# Patient Record
Sex: FEMALE | Race: WHITE | NOT HISPANIC OR LATINO
[De-identification: names, ages, dates, MRNs, and addresses within clinical notes are randomized per-mention and may not be internally consistent; named-entity substitution may affect disease eponyms.]

---

## 2019-12-31 PROBLEM — Z00.00 ENCOUNTER FOR PREVENTIVE HEALTH EXAMINATION: Status: ACTIVE | Noted: 2019-12-31

## 2020-01-30 ENCOUNTER — APPOINTMENT (OUTPATIENT)
Dept: GASTROENTEROLOGY | Facility: CLINIC | Age: 50
End: 2020-01-30
Payer: COMMERCIAL

## 2020-01-30 VITALS
OXYGEN SATURATION: 98 % | HEIGHT: 67 IN | BODY MASS INDEX: 21.35 KG/M2 | WEIGHT: 136 LBS | TEMPERATURE: 98.6 F | SYSTOLIC BLOOD PRESSURE: 105 MMHG | HEART RATE: 74 BPM | DIASTOLIC BLOOD PRESSURE: 65 MMHG | RESPIRATION RATE: 15 BRPM

## 2020-01-30 PROCEDURE — 99205 OFFICE O/P NEW HI 60 MIN: CPT

## 2020-01-30 NOTE — HISTORY OF PRESENT ILLNESS
[de-identified] : 49F presents for second opinion of GI symptoms, hiatal hernia and intestinal metaplasia. States that she felt her onset of gut symptoms sometime before DEC 2019 but they were minor so she just decided to do a 10 days cleanse with a friend. But symptoms persisted and worsened which brought her to see a GI at [place] for bloating, gas, burping, acid reflux. Had EGD showing "small hiatal hernia" as took gastric and duodenal biopsies with no celiac and gastric intestinal metaplasia on the one biopsy taken in the stomach. Took PANTOPRAZOLE [id number] with some relief. Afterward stopped seeing GI and followed up with nutritionist for dietary recommendations who referred her here for 2nd opinion. After making dietary changes has felt really good no bloating but still has a lot of gas and BURPING more than should be. Previously bowel movement every 3 day now with diet changes feels better \par NOW: no caffeine, no chocolate, no raw onion or garlic, no tomatoes\par BBQ sauce was a problem?  BURPING\par saw homeopath and ACIDIL and maybe helps bloating \par 1x a week incomplete evacuation \par hemorrhoids previously no bleeding \par ETOH 2 glasses/ night >10 years and now now only 2x/ weeks \par altered sleep \par no smoking\par no nsaids\par ran half marathons and developed tendinitis\par FHX- grandmother colon cancer, dad with BARRETTS, NO GASTRIC CANCER \par born in [place] \par

## 2020-01-30 NOTE — PHYSICAL EXAM
[General Appearance - Alert] : alert [General Appearance - In No Acute Distress] : in no acute distress [Sclera] : the sclera and conjunctiva were normal [Outer Ear] : the ears and nose were normal in appearance [Neck Appearance] : the appearance of the neck was normal [] : no respiratory distress [Heart Rate And Rhythm] : heart rate was normal and rhythm regular [Edema] : there was no peripheral edema [Bowel Sounds] : normal bowel sounds [Abdomen Soft] : soft [Abdomen Tenderness] : non-tender [Abdomen Mass (___ Cm)] : no abdominal mass palpated [No CVA Tenderness] : no ~M costovertebral angle tenderness [Abnormal Walk] : normal gait [Skin Color & Pigmentation] : normal skin color and pigmentation [No Focal Deficits] : no focal deficits [Oriented To Time, Place, And Person] : oriented to person, place, and time

## 2020-01-30 NOTE — ASSESSMENT
[FreeTextEntry1] : 49F with GAS, previous bloating and acid reflux and likely incomplete evacuation/constipation presents for second opinion.\par 1. Intestinal metaplasia with no clear risk factors -- no FHX, [place], no HP, unknown if diffuse or localized. \par - repeat EGD in 2 years with cody biopsy protocol\par - antiinflammatory diet/lifestyle including avoiding ETOH, smoking, nitrates, nitrites, nitrosamines, salty food such as salted fish, cured meat, salted vegetables, cheese, procesed meat and cured meat and avoid obestiy\par \par 2. Hiatal hernia\par -unknown size but may contribute to acid reflux although no inflammation seen on esophageal biopsies\par \par 3. Gas\par - obtain and review outside labs\par - low FODMAP diet, avoid carbonated beverages, fake sugars, chewing gum\par - referral to [facility]\par - f/u 6 weeks after full low FODMAP

## 2020-02-12 ENCOUNTER — TRANSCRIPTION ENCOUNTER (OUTPATIENT)
Age: 50
End: 2020-02-12

## 2020-02-20 ENCOUNTER — TRANSCRIPTION ENCOUNTER (OUTPATIENT)
Age: 50
End: 2020-02-20

## 2020-02-27 ENCOUNTER — TRANSCRIPTION ENCOUNTER (OUTPATIENT)
Age: 50
End: 2020-02-27

## 2020-03-07 ENCOUNTER — TRANSCRIPTION ENCOUNTER (OUTPATIENT)
Age: 50
End: 2020-03-07

## 2020-03-10 ENCOUNTER — APPOINTMENT (OUTPATIENT)
Dept: GASTROENTEROLOGY | Facility: CLINIC | Age: 50
End: 2020-03-10
Payer: COMMERCIAL

## 2020-03-10 VITALS
HEART RATE: 79 BPM | TEMPERATURE: 98.1 F | DIASTOLIC BLOOD PRESSURE: 65 MMHG | HEIGHT: 67 IN | BODY MASS INDEX: 20.72 KG/M2 | WEIGHT: 132 LBS | RESPIRATION RATE: 15 BRPM | OXYGEN SATURATION: 98 % | SYSTOLIC BLOOD PRESSURE: 97 MMHG

## 2020-03-10 PROCEDURE — 99215 OFFICE O/P EST HI 40 MIN: CPT

## 2020-03-10 NOTE — ASSESSMENT
[FreeTextEntry1] : 49F with GAS, previous bloating and acid reflux and likely incomplete evacuation/constipation presents for second opinion.\par 1. Intestinal metaplasia with no clear risk factors -- no FHX, [place], no HP, unknown if diffuse or localized. \par - repeat EGD in 2 years with cody biopsy protocol\par - antiinflammatory diet/lifestyle including avoiding ETOH, smoking, nitrates, nitrites, nitrosamines, salty food such as salted fish, cured meat, salted vegetables, cheese, procesed meat and cured meat and avoid obestiy\par \par 2. Hiatal hernia\par -unknown size but may contribute to acid reflux although no inflammation seen on esophageal biopsies\par \par 3. Gas\par - obtain and review outside labs\par - CONTINUE low FODMAP diet, avoid carbonated beverages, fake sugars, chewing gum. PT COUNSELED ON REINTRODUCTION OF FODMAP CATEGORIES AND TO CHECK IN VIA PORTAL IF ANY ISSUES\par - referral to [facility]\imani - f/u 6 weeks

## 2020-03-10 NOTE — HISTORY OF PRESENT ILLNESS
[de-identified] : 49F presents for second opinion of GI symptoms, hiatal hernia and intestinal metaplasia. \par 3/10/20\par - pt here to f/u after on low FODMAP diet failing reintroduction of FRUCTOSE but doing well with reintroduction of GLUTEN category\par - symptoms have improved significantly\par - pt also staying away from preservatives as an IM risk factor\par \par PREVIOUS HX\par States that she felt her onset of gut symptoms sometime before DEC 2019 but they were minor so she just decided to do a 10 days cleanse with a friend. But symptoms persisted and worsened which brought her to see a GI at [place] for bloating, gas, burping, acid reflux. Had EGD showing "small hiatal hernia" as took gastric and duodenal biopsies with no celiac and gastric intestinal metaplasia on the one biopsy taken in the stomach. Took PANTOPRAZOLE [id number] with some relief. Afterward stopped seeing GI and followed up with nutritionist for dietary recommendations who referred her here for 2nd opinion. After making dietary changes has felt really good no bloating but still has a lot of gas and BURPING more than should be. Previously bowel movement every 3 day now with diet changes feels better \par NOW: no caffeine, no chocolate, no raw onion or garlic, no tomatoes\par BBQ sauce was a problem?  BURPING\par saw homeopath and [facility]ID[facility] and maybe helps bloating \par 1x a week incomplete evacuation \par hemorrhoids previously no bleeding \par ETOH 2 glasses/ night >10 years and now now only 2x/ weeks \par altered sleep \par no smoking\par no nsaids\par ran half marathons and developed tendinitis\par FHX- grandmother colon cancer, dad with BARRETTS, NO GASTRIC CANCER \par born in [place] \par

## 2020-04-16 ENCOUNTER — APPOINTMENT (OUTPATIENT)
Dept: INTERNAL MEDICINE | Facility: CLINIC | Age: 50
End: 2020-04-16
Payer: COMMERCIAL

## 2020-04-16 PROCEDURE — 97802 MEDICAL NUTRITION INDIV IN: CPT | Mod: 95

## 2020-04-24 ENCOUNTER — APPOINTMENT (OUTPATIENT)
Dept: INTERNAL MEDICINE | Facility: CLINIC | Age: 50
End: 2020-04-24

## 2020-06-18 ENCOUNTER — APPOINTMENT (OUTPATIENT)
Dept: INTERNAL MEDICINE | Facility: CLINIC | Age: 50
End: 2020-06-18
Payer: COMMERCIAL

## 2020-06-18 PROCEDURE — 97803 MED NUTRITION INDIV SUBSEQ: CPT | Mod: 95

## 2020-08-06 ENCOUNTER — APPOINTMENT (OUTPATIENT)
Dept: GASTROENTEROLOGY | Facility: CLINIC | Age: 50
End: 2020-08-06
Payer: COMMERCIAL

## 2020-08-06 PROCEDURE — 99214 OFFICE O/P EST MOD 30 MIN: CPT | Mod: 95

## 2020-08-07 NOTE — ASSESSMENT
[FreeTextEntry1] : 50F with GAS, previous bloating and acid reflux and likely incomplete evacuation/constipation presents for second opinion, here for follow up \par \par 1. Intestinal metaplasia with no clear risk factors -- no FHX, born in [place], no HP, unknown if diffuse or localized. \par - repeat EGD in 1 year \par - antiinflammatory diet/lifestyle including avoiding ETOH, smoking, nitrates, nitrites, nitrosamines, salty food such as salted fish, cured meat, salted vegetables, cheese, processed meat and cured meat and avoid obestiy\par \par 2. Hiatal hernia\par -unknown size but may contribute to acid reflux although no inflammation seen on esophageal biopsies\par \par 3. Gas\par - CONTINUE low FODMAP diet, avoid carbonated beverages, fake sugars, chewing gum. PT COUNSELED ON REINTRODUCTION OF FODMAP CATEGORIES AND TO CHECK IN VIA PORTAL IF ANY ISSUES\par - continue f/u with [facility]\par -begin adding teaspoon a day of Psyllium husk daily and ground  flax seed \par - stay well hydrated, add green tea \par - stress reduction \par \par f/u via portal \par \par Mignon Crook NP \par \par time spent 25min

## 2020-08-07 NOTE — HISTORY OF PRESENT ILLNESS
[Home] : at home, [unfilled] , at the time of the visit. [Medical Office: ([facility])___] : at the medical office located in  [Verbal consent obtained from patient] : the patient, [unfilled] [de-identified] : 50F presents for second opinion of GI symptoms, hiatal hernia and intestinal metaplasia, here for follow up VIA TELEMEDICINE. \par \par 8/6/20 \par - mostly doing okay \par - wonders if has a lot of gas if could be cancer although gas is improved \par - never had colonoscopy \par - #1 symptom: gas from below and above\par - tried adding back in dairy and realized can only eat small amount if any\par - eating a lot is a trigger \par - taking Magnesium \par \par 3/10/20\par - pt here to f/u after on low FODMAP diet failing reintroduction of FRUCTOSE but doing well with reintroduction of GLUTEN category\par - symptoms have improved significantly\par - pt also staying away from preservatives as an IM risk factor\par \par PREVIOUS HX\par States that she felt her onset of gut symptoms sometime before DEC 2019 but they were minor so she just decided to do a 10 days cleanse with a friend. But symptoms persisted and worsened which brought her to see a GI at [place] for bloating, gas, burping, acid reflux. Had EGD showing "small hiatal hernia" as took gastric and duodenal biopsies with no celiac and gastric intestinal metaplasia on the one biopsy taken in the stomach. Took PANTOPRAZOLE [id number] with some relief. Afterward stopped seeing GI and followed up with nutritionist for dietary recommendations who referred her here for 2nd opinion. After making dietary changes has felt really good no bloating but still has a lot of gas and BURPING more than should be. Previously bowel movement every 3 day now with diet changes feels better \par NOW: no caffeine, no chocolate, no raw onion or garlic, no tomatoes\par BBQ sauce was a problem?  BURPING\par saw homeopath and ACIDIL and maybe helps bloating \par 1x a week incomplete evacuation \par hemorrhoids previously no bleeding \par ETOH 2 glasses/ night >10 years and now now only 2x/ weeks \par altered sleep \par no smoking\par no nsaids\par ran half marathons and developed tendinitis\par FHX- grandmother colon cancer, dad with BARRETTS, NO GASTRIC CANCER \par born in [place] \par

## 2020-09-02 ENCOUNTER — TRANSCRIPTION ENCOUNTER (OUTPATIENT)
Age: 50
End: 2020-09-02

## 2020-09-25 ENCOUNTER — TRANSCRIPTION ENCOUNTER (OUTPATIENT)
Age: 50
End: 2020-09-25

## 2020-10-05 ENCOUNTER — TRANSCRIPTION ENCOUNTER (OUTPATIENT)
Age: 50
End: 2020-10-05

## 2020-10-08 ENCOUNTER — TRANSCRIPTION ENCOUNTER (OUTPATIENT)
Age: 50
End: 2020-10-08

## 2020-10-13 ENCOUNTER — TRANSCRIPTION ENCOUNTER (OUTPATIENT)
Age: 50
End: 2020-10-13

## 2020-10-22 ENCOUNTER — APPOINTMENT (OUTPATIENT)
Dept: INTERNAL MEDICINE | Facility: CLINIC | Age: 50
End: 2020-10-22
Payer: COMMERCIAL

## 2020-10-22 PROCEDURE — 97803 MED NUTRITION INDIV SUBSEQ: CPT | Mod: 95

## 2022-04-28 ENCOUNTER — APPOINTMENT (OUTPATIENT)
Dept: GASTROENTEROLOGY | Facility: CLINIC | Age: 52
End: 2022-04-28

## 2022-07-26 ENCOUNTER — APPOINTMENT (OUTPATIENT)
Dept: GASTROENTEROLOGY | Facility: CLINIC | Age: 52
End: 2022-07-26

## 2022-07-26 DIAGNOSIS — K20.90 ESOPHAGITIS, UNSPECIFIED WITHOUT BLEEDING: ICD-10-CM

## 2022-07-26 PROCEDURE — 99215 OFFICE O/P EST HI 40 MIN: CPT | Mod: 95

## 2022-07-26 RX ORDER — OMEPRAZOLE 40 MG/1
40 CAPSULE, DELAYED RELEASE ORAL
Qty: 30 | Refills: 2 | Status: ACTIVE | COMMUNITY
Start: 2022-07-26 | End: 1900-01-01

## 2022-07-28 NOTE — PHYSICAL EXAM
[General Appearance - Alert] : alert [General Appearance - In No Acute Distress] : in no acute distress [Outer Ear] : the ears and nose were normal in appearance

## 2022-07-30 NOTE — REASON FOR VISIT
[Home] : at home, [unfilled] , at the time of the visit. [Medical Office: ([facility])___] : at the medical office located in  [Patient] : the patient [Self] : self [Follow-Up: _____] : a [unfilled] follow-up visit [FreeTextEntry1] : gas

## 2022-07-30 NOTE — ASSESSMENT
[FreeTextEntry1] : 50F with GAS, previous bloating and acid reflux and likely incomplete evacuation/constipation presents for second opinion, here for follow up \par \par 1. Intestinal metaplasia with no clear risk factors -- no FHX, [place], no HP, unknown if diffuse or localized. \par - s/p EGD with no IM \par - antiinflammatory diet/lifestyle including avoiding ETOH, smoking, nitrates, nitrites, nitrosamines, salty food such as salted fish, cured meat, salted vegetables, cheese, processed meat and cured meat and avoid obestiy\par \par 2. Chronic carditis on EGD\par - consider lifestyle./diet and PPI x2mo\par \par 3. BURPING, BLOATING GAS and intermittnet constipation\par - mg ox 500\par - sibo test\par - Psyllium husk daily and ground  flax seed \par - stay well hydrated, add green tea \par - stress reduction \par - sibo test\par \par f/u 3mo

## 2022-07-30 NOTE — HISTORY OF PRESENT ILLNESS
[Home] : at home, [unfilled] , at the time of the visit. [Medical Office: ([facility])___] : at the medical office located in  [Verbal consent obtained from patient] : the patient, [unfilled] [de-identified] : 50F presents for second opinion of GI symptoms, hiatal hernia and intestinal metaplasia, here for follow up VIA TELEMEDICINE. \par \par 7/26/22\par did cscope, endoscopy with DR VARGAS\par EGD 5/22 = gastropahy, chronic carditis \par CSCOPE 5/33= normal , repeat 10 years\par joined wellness group and cut down ETOH and sugar intake a lot , daily meditation, therapy for ++stress\par labs ok \par takes psylium husk\par #1 burping\par #2 bloating, gas\par sometimes small hard incomplete bowel movements\par takes psyllium husk, flax seed oil , vitamin b12, dgl, \par \par \par 8/6/20 \par - mostly doing okay \par - wonders if has a lot of gas if could be cancer although gas is improved \par - never had colonoscopy \par - #1 symptom: gas from below and above\par - tried adding back in dairy and realized can only eat small amount if any\par - eating a lot is a trigger \par - taking Magnesium \par \par 3/10/20\par - pt here to f/u after on low FODMAP diet failing reintroduction of FRUCTOSE but doing well with reintroduction of GLUTEN category\par - symptoms have improved significantly\par - pt also staying away from preservatives as an IM risk factor\par \par PREVIOUS HX\par States that she felt her onset of gut symptoms sometime before DEC 2019 but they were minor so she just decided to do a 10 days cleanse with a friend. But symptoms persisted and worsened which brought her to see a GI at [place] for bloating, gas, burping, acid reflux. Had EGD showing "small hiatal hernia" as took gastric and duodenal biopsies with no celiac and gastric intestinal metaplasia on the one biopsy taken in the stomach. Took PANTOPRAZOLE [id number] with some relief. Afterward stopped seeing GI and followed up with nutritionist for dietary recommendations who referred her here for 2nd opinion. After making dietary changes has felt really good no bloating but still has a lot of gas and BURPING more than should be. Previously bowel movement every 3 day now with diet changes feels better \par NOW: no caffeine, no chocolate, no raw onion or garlic, no tomatoes\par BBQ sauce was a problem?  BURPING\par saw homeopath and [facility]IDIL and maybe helps bloating \par 1x a week incomplete evacuation \par hemorrhoids previously no bleeding \par ETOH 2 glasses/ night >10 years and now now only 2x/ weeks \par altered sleep \par no smoking\par no nsaids\par ran half marathons and developed tendinitis\par FHX- grandmother colon cancer, dad with BARRETTS, NO GASTRIC CANCER \par born in [place] \par

## 2022-08-17 ENCOUNTER — TRANSCRIPTION ENCOUNTER (OUTPATIENT)
Age: 52
End: 2022-08-17

## 2022-08-23 ENCOUNTER — APPOINTMENT (OUTPATIENT)
Dept: INTERNAL MEDICINE | Facility: CLINIC | Age: 52
End: 2022-08-23

## 2022-08-23 VITALS — BODY MASS INDEX: 21.97 KG/M2 | HEIGHT: 67 IN | WEIGHT: 140 LBS

## 2022-08-23 PROCEDURE — 97802 MEDICAL NUTRITION INDIV IN: CPT | Mod: 95

## 2022-10-03 ENCOUNTER — APPOINTMENT (OUTPATIENT)
Dept: INTERNAL MEDICINE | Facility: CLINIC | Age: 52
End: 2022-10-03

## 2022-10-03 PROCEDURE — 97803 MED NUTRITION INDIV SUBSEQ: CPT | Mod: 95

## 2022-10-20 ENCOUNTER — TRANSCRIPTION ENCOUNTER (OUTPATIENT)
Age: 52
End: 2022-10-20

## 2022-10-21 ENCOUNTER — APPOINTMENT (OUTPATIENT)
Dept: GASTROENTEROLOGY | Facility: CLINIC | Age: 52
End: 2022-10-21

## 2022-12-07 ENCOUNTER — TRANSCRIPTION ENCOUNTER (OUTPATIENT)
Age: 52
End: 2022-12-07

## 2022-12-16 ENCOUNTER — APPOINTMENT (OUTPATIENT)
Dept: GASTROENTEROLOGY | Facility: CLINIC | Age: 52
End: 2022-12-16

## 2022-12-16 PROCEDURE — 99214 OFFICE O/P EST MOD 30 MIN: CPT | Mod: 95

## 2022-12-16 NOTE — ASSESSMENT
[FreeTextEntry1] : 50F with GAS, previous bloating and acid reflux and likely incomplete evacuation/constipation presents for second opinion, here for follow up \par \par 1. Intestinal metaplasia with no clear risk factors -- no FHX, [place], no HP, unknown if diffuse or localized. \par - s/p EGD with no IM \par - antiinflammatory diet/lifestyle including avoiding ETOH, smoking, nitrates, nitrites, nitrosamines, salty food such as salted fish, cured meat, salted vegetables, cheese, processed meat and cured meat and avoid obestiy\par \par 2. Chronic carditis on EGD NOW ASYMPTOMATIC \par -  continue lifestyle./diet \par \par 3. BURPING, BLOATING GAS and intermittent constipation that has improved \par - continue mg ox 500\par - sibo test POSITIVE FOR IMO but in the setting of clinical improvement will hold off on treatment unless diet becomes very restrictive or symptoms become unmanageable/flare persistently\par - Psyllium husk daily and ground flax seed \par - stay well hydrated, green tea \par - stress reduction \par \par f/u in scheduled appt in june 2023 unless change in clinical condition

## 2022-12-16 NOTE — HISTORY OF PRESENT ILLNESS
[Home] : at home, [unfilled] , at the time of the visit. [Medical Office: ([facility])___] : at the medical office located in  [Verbal consent obtained from patient] : the patient, [unfilled] [de-identified] : 50F presents for second opinion of GI symptoms, hiatal hernia and intestinal metaplasia, here for follow up VIA TELEMEDICINE. \par 12/16/22\par GERD RESOLVED everything ok just cant overeat, do things in moderation\par did sibo test on 12/4 +IMO\par taking magnesium, dgl, 2 spoon psyllium and flax seed oil\par got  in august and gained weight \par 1-2 days a week eats something that flares her\par \par \par 7/26/22\par did cscope, endoscopy with DR VARGAS\par EGD 5/22 = gastropahy, chronic carditis \par CSCOPE 5/33= normal , repeat 10 years\par joined wellness group and cut down ETOH and sugar intake a lot , daily meditation, therapy for ++stress\par labs ok \par takes psylium husk\par #1 burping\par #2 bloating, gas\par sometimes small hard incomplete bowel movements\par takes psyllium husk, flax seed oil , vitamin b12, dgl, \par \par \par 8/6/20 \par - mostly doing okay \par - wonders if has a lot of gas if could be cancer although gas is improved \par - never had colonoscopy \par - #1 symptom: gas from below and above\par - tried adding back in dairy and realized can only eat small amount if any\par - eating a lot is a trigger \par - taking Magnesium \par \par 3/10/20\par - pt here to f/u after on low FODMAP diet failing reintroduction of FRUCTOSE but doing well with reintroduction of GLUTEN category\par - symptoms have improved significantly\par - pt also staying away from preservatives as an IM risk factor\par \par PREVIOUS HX\par States that she felt her onset of gut symptoms sometime before DEC 2019 but they were minor so she just decided to do a 10 days cleanse with a friend. But symptoms persisted and worsened which brought her to see a GI at [facility] for bloating, gas, burping, acid reflux. Had EGD showing "small hiatal hernia" as took gastric and duodenal biopsies with no celiac and gastric intestinal metaplasia on the one biopsy taken in the stomach. Took PANTOPRAZOLE [id number] with some relief. Afterward stopped seeing GI and followed up with nutritionist for dietary recommendations who referred her here for 2nd opinion. After making dietary changes has felt really good no bloating but still has a lot of gas and BURPING more than should be. Previously bowel movement every 3 day now with diet changes feels better \par NOW: no caffeine, no chocolate, no raw onion or garlic, no tomatoes\par BBQ sauce was a problem?  BURPING\par saw homeopath and [facility]IDIL and maybe helps bloating \par 1x a week incomplete evacuation \par hemorrhoids previously no bleeding \par ETOH 2 glasses/ night >10 years and now now only 2x/ weeks \par altered sleep \par no smoking\par no nsaids\par ran half marathons and developed tendinitis\par FHX- grandmother colon cancer, dad with BARRETTS, NO GASTRIC CANCER \par born in [place] \par

## 2022-12-16 NOTE — REASON FOR VISIT
[Follow-up] : a follow-up of an existing diagnosis [Home] : at home, [unfilled] , at the time of the visit. [Medical Office: ([facility])___] : at the medical office located in  [Patient] : the patient [Self] : self [Follow-Up: _____] : a [unfilled] follow-up visit [FreeTextEntry1] : gas

## 2023-01-24 ENCOUNTER — TRANSCRIPTION ENCOUNTER (OUTPATIENT)
Age: 53
End: 2023-01-24

## 2023-02-05 ENCOUNTER — TRANSCRIPTION ENCOUNTER (OUTPATIENT)
Age: 53
End: 2023-02-05

## 2023-02-09 ENCOUNTER — TRANSCRIPTION ENCOUNTER (OUTPATIENT)
Age: 53
End: 2023-02-09

## 2023-02-16 ENCOUNTER — TRANSCRIPTION ENCOUNTER (OUTPATIENT)
Age: 53
End: 2023-02-16

## 2023-02-20 ENCOUNTER — TRANSCRIPTION ENCOUNTER (OUTPATIENT)
Age: 53
End: 2023-02-20

## 2023-02-23 ENCOUNTER — TRANSCRIPTION ENCOUNTER (OUTPATIENT)
Age: 53
End: 2023-02-23

## 2023-02-24 ENCOUNTER — APPOINTMENT (OUTPATIENT)
Dept: GASTROENTEROLOGY | Facility: CLINIC | Age: 53
End: 2023-02-24
Payer: COMMERCIAL

## 2023-02-24 PROCEDURE — 99442: CPT

## 2023-02-28 ENCOUNTER — TRANSCRIPTION ENCOUNTER (OUTPATIENT)
Age: 53
End: 2023-02-28

## 2023-03-06 ENCOUNTER — TRANSCRIPTION ENCOUNTER (OUTPATIENT)
Age: 53
End: 2023-03-06

## 2023-03-23 ENCOUNTER — APPOINTMENT (OUTPATIENT)
Age: 53
End: 2023-03-23

## 2023-03-24 ENCOUNTER — APPOINTMENT (OUTPATIENT)
Age: 53
End: 2023-03-24
Payer: COMMERCIAL

## 2023-03-24 DIAGNOSIS — R14.0 ABDOMINAL DISTENSION (GASEOUS): ICD-10-CM

## 2023-03-24 PROCEDURE — 97803 MED NUTRITION INDIV SUBSEQ: CPT | Mod: 95

## 2023-03-31 ENCOUNTER — TRANSCRIPTION ENCOUNTER (OUTPATIENT)
Age: 53
End: 2023-03-31

## 2023-04-12 ENCOUNTER — TRANSCRIPTION ENCOUNTER (OUTPATIENT)
Age: 53
End: 2023-04-12

## 2023-04-14 ENCOUNTER — TRANSCRIPTION ENCOUNTER (OUTPATIENT)
Age: 53
End: 2023-04-14

## 2023-04-21 ENCOUNTER — APPOINTMENT (OUTPATIENT)
Dept: GASTROENTEROLOGY | Facility: CLINIC | Age: 53
End: 2023-04-21
Payer: COMMERCIAL

## 2023-04-21 PROCEDURE — 99213 OFFICE O/P EST LOW 20 MIN: CPT | Mod: 95

## 2023-04-23 NOTE — REASON FOR VISIT
[Follow-up] : a follow-up of an existing diagnosis [Home] : at home, [unfilled] , at the time of the visit. [Medical Office: ([facility])___] : at the medical office located in  [Patient] : the patient [Self] : self [Follow-Up: _____] : a [unfilled] follow-up visit [FreeTextEntry1] : gas

## 2023-04-23 NOTE — HISTORY OF PRESENT ILLNESS
[Home] : at home, [unfilled] , at the time of the visit. [Medical Office: ([facility])___] : at the medical office located in  [Verbal consent obtained from patient] : the patient, [unfilled] [de-identified] : 50F presents for second opinion of GI symptoms, hiatal hernia and intestinal metaplasia, here for follow up VIA TELEMEDICINE. \par 4/21/23\par moved out of her apt that had mold\par on herbal protocol and feels great \par \par \par 12/16/22\par GERD RESOLVED everything ok just cant overeat, do things in moderation\par did sibo test on 12/4 +IMO\par taking magnesium, dgl, 2 spoon psyllium and flax seed oil\par got  in august and gained weight \par 1-2 days a week eats something that flares her\par \par \par 7/26/22\par did cscope, endoscopy with DR VARGAS\par EGD 5/22 = gastropahy, chronic carditis \par CSCOPE 5/33= normal , repeat 10 years\par joined wellness group and cut down ETOH and sugar intake a lot , daily meditation, therapy for ++stress\par labs ok \par takes psylium husk\par #1 burping\par #2 bloating, gas\par sometimes small hard incomplete bowel movements\par takes psyllium husk, flax seed oil , vitamin b12, dgl, \par \par \par 8/6/20 \par - mostly doing okay \par - wonders if has a lot of gas if could be cancer although gas is improved \par - never had colonoscopy \par - #1 symptom: gas from below and above\par - tried adding back in dairy and realized can only eat small amount if any\par - eating a lot is a trigger \par - taking Magnesium \par \par 3/10/20\par - pt here to f/u after on low FODMAP diet failing reintroduction of FRUCTOSE but doing well with reintroduction of GLUTEN category\par - symptoms have improved significantly\par - pt also staying away from preservatives as an IM risk factor\par \par PREVIOUS HX\par States that she felt her onset of gut symptoms sometime before DEC 2019 but they were minor so she just decided to do a 10 days cleanse with a friend. But symptoms persisted and worsened which brought her to see a GI at [place] for bloating, gas, burping, acid reflux. Had EGD showing "small hiatal hernia" as took gastric and duodenal biopsies with no celiac and gastric intestinal metaplasia on the one biopsy taken in the stomach. Took PANTOPRAZOLE [id number] with some relief. Afterward stopped seeing GI and followed up with nutritionist for dietary recommendations who referred her here for 2nd opinion. After making dietary changes has felt really good no bloating but still has a lot of gas and BURPING more than should be. Previously bowel movement every 3 day now with diet changes feels better \par NOW: no caffeine, no chocolate, no raw onion or garlic, no tomatoes\par BBQ sauce was a problem?  BURPING\par saw homeopath and [facility]IDIL and maybe helps bloating \par 1x a week incomplete evacuation \par hemorrhoids previously no bleeding \par ETOH 2 glasses/ night >10 years and now now only 2x/ weeks \par altered sleep \par no smoking\par no nsaids\par ran half marathons and developed tendinitis\par FHX- grandmother colon cancer, dad with BARRETTS, NO GASTRIC CANCER \par born in [place] \par

## 2023-04-23 NOTE — ASSESSMENT
[FreeTextEntry1] : 50F with GAS, previous bloating and acid reflux and likely incomplete evacuation/constipation presents for second opinion, here for follow up \par \par 1. Intestinal metaplasia with no clear risk factors -- no FHX, [place], no HP, unknown if diffuse or localized. \par - s/p EGD with no IM \par - antiinflammatory diet/lifestyle including avoiding ETOH, smoking, nitrates, nitrites, nitrosamines, salty food such as salted fish, cured meat, salted vegetables, cheese, processed meat and cured meat and avoid obestiy\par \par 2. Chronic carditis on EGD NOW ASYMPTOMATIC \par -  continue lifestyle./diet \par \par 3. BURPING, BLOATING GAS and intermittent constipation that has improved \par - continue mg ox 500\par - sibo test POSITIVE FOR IMO on herbal protocol \par - Psyllium husk daily and ground flax seed \par - stay well hydrated, green tea \par - stress reduction \par \par f/u in scheduled appt in june 2023 unless change in clinical condition

## 2023-05-01 ENCOUNTER — TRANSCRIPTION ENCOUNTER (OUTPATIENT)
Age: 53
End: 2023-05-01

## 2023-05-12 ENCOUNTER — NON-APPOINTMENT (OUTPATIENT)
Age: 53
End: 2023-05-12

## 2023-05-15 ENCOUNTER — NON-APPOINTMENT (OUTPATIENT)
Age: 53
End: 2023-05-15

## 2023-05-17 ENCOUNTER — NON-APPOINTMENT (OUTPATIENT)
Age: 53
End: 2023-05-17

## 2023-05-18 ENCOUNTER — TRANSCRIPTION ENCOUNTER (OUTPATIENT)
Age: 53
End: 2023-05-18

## 2023-05-19 ENCOUNTER — TRANSCRIPTION ENCOUNTER (OUTPATIENT)
Age: 53
End: 2023-05-19

## 2023-05-19 ENCOUNTER — NON-APPOINTMENT (OUTPATIENT)
Age: 53
End: 2023-05-19

## 2023-05-22 ENCOUNTER — TRANSCRIPTION ENCOUNTER (OUTPATIENT)
Age: 53
End: 2023-05-22

## 2023-06-07 ENCOUNTER — TRANSCRIPTION ENCOUNTER (OUTPATIENT)
Age: 53
End: 2023-06-07

## 2023-06-08 ENCOUNTER — TRANSCRIPTION ENCOUNTER (OUTPATIENT)
Age: 53
End: 2023-06-08

## 2023-06-09 ENCOUNTER — TRANSCRIPTION ENCOUNTER (OUTPATIENT)
Age: 53
End: 2023-06-09

## 2023-06-09 ENCOUNTER — APPOINTMENT (OUTPATIENT)
Dept: INTERNAL MEDICINE | Facility: CLINIC | Age: 53
End: 2023-06-09

## 2023-06-12 ENCOUNTER — TRANSCRIPTION ENCOUNTER (OUTPATIENT)
Age: 53
End: 2023-06-12

## 2023-06-15 ENCOUNTER — APPOINTMENT (OUTPATIENT)
Dept: GASTROENTEROLOGY | Facility: CLINIC | Age: 53
End: 2023-06-15
Payer: COMMERCIAL

## 2023-06-15 VITALS
TEMPERATURE: 97.6 F | WEIGHT: 137 LBS | DIASTOLIC BLOOD PRESSURE: 71 MMHG | BODY MASS INDEX: 21.5 KG/M2 | SYSTOLIC BLOOD PRESSURE: 113 MMHG | HEIGHT: 67 IN | OXYGEN SATURATION: 96 % | HEART RATE: 76 BPM

## 2023-06-15 PROCEDURE — 99214 OFFICE O/P EST MOD 30 MIN: CPT

## 2023-06-20 ENCOUNTER — TRANSCRIPTION ENCOUNTER (OUTPATIENT)
Age: 53
End: 2023-06-20

## 2023-06-28 NOTE — HISTORY OF PRESENT ILLNESS
[de-identified] : 50F presents for second opinion of GI symptoms, hiatal hernia and intestinal metaplasia, here for follow up VIA TELEMEDICINE. \par \par 6/15/23\par felt some improvement but still having food sensitivity\par high candida IgE\par \par \par 4/21/23\par moved out of her apt that had mold\par on herbal protocol and feels great \par \par \par 12/16/22\par GERD RESOLVED everything ok just cant overeat, do things in moderation\par did sibo test on 12/4 +IMO\par taking magnesium, dgl, 2 spoon psyllium and flax seed oil\par got  in august and gained weight \par 1-2 days a week eats something that flares her\par \par \par 7/26/22\par did cscope, endoscopy with DR VARGAS\par EGD 5/22 = gastropahy, chronic carditis \par CSCOPE 5/33= normal , repeat 10 years\par joined wellness group and cut down ETOH and sugar intake a lot , daily meditation, therapy for ++stress\par labs ok \par takes psylium husk\par #1 burping\par #2 bloating, gas\par sometimes small hard incomplete bowel movements\par takes psyllium husk, flax seed oil , vitamin b12, dgl, \par \par \par 8/6/20 \par - mostly doing okay \par - wonders if has a lot of gas if could be cancer although gas is improved \par - never had colonoscopy \par - #1 symptom: gas from below and above\par - tried adding back in dairy and realized can only eat small amount if any\par - eating a lot is a trigger \par - taking Magnesium \par \par 3/10/20\par - pt here to f/u after on low FODMAP diet failing reintroduction of FRUCTOSE but doing well with reintroduction of GLUTEN category\par - symptoms have improved significantly\par - pt also staying away from preservatives as an IM risk factor\par \par PREVIOUS HX\par States that she felt her onset of gut symptoms sometime before DEC 2019 but they were minor so she just decided to do a 10 days cleanse with a friend. But symptoms persisted and worsened which brought her to see a GI at [facility] for bloating, gas, burping, acid reflux. Had EGD showing "small hiatal hernia" as took gastric and duodenal biopsies with no celiac and gastric intestinal metaplasia on the one biopsy taken in the stomach. Took PANTOPRAZOLE [id number] with some relief. Afterward stopped seeing GI and followed up with nutritionist for dietary recommendations who referred her here for 2nd opinion. After making dietary changes has felt really good no bloating but still has a lot of gas and BURPING more than should be. Previously bowel movement every 3 day now with diet changes feels better \par NOW: no caffeine, no chocolate, no raw onion or garlic, no tomatoes\par BBQ sauce was a problem?  BURPING\par saw homeopath and [facility] and maybe helps bloating \par 1x a week incomplete evacuation \par hemorrhoids previously no bleeding \par ETOH 2 glasses/ night >10 years and now now only 2x/ weeks \par altered sleep \par no smoking\par no nsaids\par ran half marathons and developed tendinitis\par FHX- grandmother colon cancer, dad with BARRETTS, NO GASTRIC CANCER \par born in [place] \par  [Home] : at home, [unfilled] , at the time of the visit. [Medical Office: ([facility])___] : at the medical office located in  [Verbal consent obtained from patient] : the patient, [unfilled]

## 2023-06-28 NOTE — ASSESSMENT
[FreeTextEntry1] : 50F with GAS, previous bloating and acid reflux and likely incomplete evacuation/constipation presents for second opinion, here for follow up \par \par 1. Intestinal metaplasia with no clear risk factors -- no FHX, born in [place], no HP, unknown if diffuse or localized. \par - s/p EGD with no IM \par - antiinflammatory diet/lifestyle including avoiding ETOH, smoking, nitrates, nitrites, nitrosamines, salty food such as salted fish, cured meat, salted vegetables, cheese, processed meat and cured meat and avoid obestiy\par \par 2. Chronic carditis on EGD NOW ASYMPTOMATIC \par -  continue lifestyle./diet \par \par 3. BURPING, BLOATING GAS and intermittent constipation that has improved \par - continue mg ox 500\par - sibo test POSITIVE FOR IMO on herbal protocol \par - Psyllium husk daily and ground flax seed \par - stay well hydrated, green tea \par - stress reduction \par \par COLONOSCOPY 2022  with dr lee normal - repeat in 2032 10 years \par \par f/u in scheduled appt in june 2023 unless change in clinical condition; - pt understands i am leaving practice in june therefore to f/u with me in new practice or with [facility] colleague and to get f/u appt asap so gets seen in a timely fashion, pt understands so does not get lost to follow up\par

## 2023-06-28 NOTE — REASON FOR VISIT
[Follow-up] : a follow-up of an existing diagnosis [Follow-Up: _____] : a [unfilled] follow-up visit [FreeTextEntry1] : gas

## 2023-06-29 ENCOUNTER — TRANSCRIPTION ENCOUNTER (OUTPATIENT)
Age: 53
End: 2023-06-29

## 2023-06-30 ENCOUNTER — TRANSCRIPTION ENCOUNTER (OUTPATIENT)
Age: 53
End: 2023-06-30

## 2023-06-30 LAB
ALUMINUM SERPL-MCNC: 6 UG/L
ARSENIC BLD-MCNC: 5 UG/L
CADMIUM SERPL-MCNC: <0.5 UG/L
LEAD BLD-MCNC: 1.6 UG/DL
MERCURY BLD-MCNC: 13.2 UG/L